# Patient Record
Sex: FEMALE | Race: WHITE | Employment: FULL TIME | URBAN - METROPOLITAN AREA
[De-identification: names, ages, dates, MRNs, and addresses within clinical notes are randomized per-mention and may not be internally consistent; named-entity substitution may affect disease eponyms.]

---

## 2017-02-21 PROBLEM — F41.9 ANXIETY: Status: ACTIVE | Noted: 2017-02-21

## 2017-03-21 PROBLEM — K21.9 GASTROESOPHAGEAL REFLUX DISEASE: Status: ACTIVE | Noted: 2017-03-21

## 2018-04-12 PROBLEM — G56.01 CARPAL TUNNEL SYNDROME OF RIGHT WRIST: Status: ACTIVE | Noted: 2018-04-12

## 2018-04-12 PROBLEM — M25.551 PAIN OF RIGHT HIP JOINT: Status: ACTIVE | Noted: 2018-04-12

## 2018-04-12 PROBLEM — M75.41 SHOULDER IMPINGEMENT SYNDROME, RIGHT: Status: ACTIVE | Noted: 2018-04-12

## 2018-04-12 PROBLEM — M54.2 CERVICALGIA: Status: ACTIVE | Noted: 2018-04-12

## 2018-04-12 PROBLEM — Z80.0 FH: ESOPHAGEAL CANCER: Status: ACTIVE | Noted: 2018-04-12

## 2019-04-11 ENCOUNTER — OFFICE VISIT (OUTPATIENT)
Dept: FAMILY MEDICINE CLINIC | Age: 64
End: 2019-04-11
Payer: COMMERCIAL

## 2019-04-11 VITALS
WEIGHT: 174 LBS | RESPIRATION RATE: 14 BRPM | HEIGHT: 66 IN | OXYGEN SATURATION: 97 % | HEART RATE: 65 BPM | SYSTOLIC BLOOD PRESSURE: 108 MMHG | BODY MASS INDEX: 27.97 KG/M2 | DIASTOLIC BLOOD PRESSURE: 74 MMHG

## 2019-04-11 DIAGNOSIS — G56.01 CARPAL TUNNEL SYNDROME OF RIGHT WRIST: ICD-10-CM

## 2019-04-11 DIAGNOSIS — F41.9 ANXIETY: ICD-10-CM

## 2019-04-11 DIAGNOSIS — E66.9 OBESITY (BMI 30-39.9): ICD-10-CM

## 2019-04-11 DIAGNOSIS — K21.9 GASTROESOPHAGEAL REFLUX DISEASE, ESOPHAGITIS PRESENCE NOT SPECIFIED: ICD-10-CM

## 2019-04-11 DIAGNOSIS — E66.01 CLASS 3 SEVERE OBESITY DUE TO EXCESS CALORIES WITH SERIOUS COMORBIDITY AND BODY MASS INDEX (BMI) OF 50.0 TO 59.9 IN ADULT (HCC): ICD-10-CM

## 2019-04-11 DIAGNOSIS — M15.9 GENERALIZED OA: ICD-10-CM

## 2019-04-11 DIAGNOSIS — Z80.0 FH: ESOPHAGEAL CANCER: ICD-10-CM

## 2019-04-11 DIAGNOSIS — M54.2 CERVICALGIA: ICD-10-CM

## 2019-04-11 DIAGNOSIS — I10 ESSENTIAL HYPERTENSION: Primary | ICD-10-CM

## 2019-04-11 PROCEDURE — 99214 OFFICE O/P EST MOD 30 MIN: CPT | Performed by: FAMILY MEDICINE

## 2019-04-11 RX ORDER — BENAZEPRIL HYDROCHLORIDE 20 MG/1
TABLET ORAL
Qty: 30 TABLET | Refills: 2 | Status: SHIPPED | OUTPATIENT
Start: 2019-04-11 | End: 2019-07-29 | Stop reason: SDUPTHER

## 2019-04-11 RX ORDER — PHENTERMINE HYDROCHLORIDE 37.5 MG/1
37.5 TABLET ORAL
Qty: 30 TABLET | Refills: 0 | Status: SHIPPED | OUTPATIENT
Start: 2019-04-11 | End: 2019-05-11

## 2019-04-11 RX ORDER — TOPIRAMATE 50 MG/1
TABLET, FILM COATED ORAL
Qty: 120 TABLET | Refills: 2 | Status: SHIPPED | OUTPATIENT
Start: 2019-04-11 | End: 2019-07-29 | Stop reason: SDUPTHER

## 2019-04-11 RX ORDER — AMLODIPINE BESYLATE 5 MG/1
TABLET ORAL
Qty: 30 TABLET | Refills: 2 | Status: SHIPPED | OUTPATIENT
Start: 2019-04-11 | End: 2019-07-29 | Stop reason: SDUPTHER

## 2019-04-11 ASSESSMENT — ENCOUNTER SYMPTOMS
VOMITING: 0
EYE DISCHARGE: 0
COLOR CHANGE: 0
ANAL BLEEDING: 0
FACIAL SWELLING: 0
WHEEZING: 0
APNEA: 0
SHORTNESS OF BREATH: 0
NAUSEA: 0
ABDOMINAL PAIN: 1
TROUBLE SWALLOWING: 0
DIARRHEA: 0
SORE THROAT: 0
PHOTOPHOBIA: 0
CHEST TIGHTNESS: 0
BACK PAIN: 0
CONSTIPATION: 0
ABDOMINAL DISTENTION: 0
EYE PAIN: 0
SINUS PRESSURE: 0

## 2019-04-11 ASSESSMENT — PATIENT HEALTH QUESTIONNAIRE - PHQ9
SUM OF ALL RESPONSES TO PHQ QUESTIONS 1-9: 0
1. LITTLE INTEREST OR PLEASURE IN DOING THINGS: 0
2. FEELING DOWN, DEPRESSED OR HOPELESS: 0
SUM OF ALL RESPONSES TO PHQ9 QUESTIONS 1 & 2: 0
SUM OF ALL RESPONSES TO PHQ QUESTIONS 1-9: 0

## 2019-04-11 NOTE — PROGRESS NOTES
Asmita Saavedra MD, PhD Janae Bravo  22.      Obdulio Munoz is a 60 y.o. female who presents today for her medical conditions/complaints as noted below. Obdulio Munoz is c/o of   Chief Complaint   Patient presents with    Hypertension     ck up    Abdominal Pain         HPI:     Hypertension   This is a chronic problem. The problem is unchanged. The problem is controlled. Pertinent negatives include no chest pain, neck pain, palpitations or shortness of breath. Abdominal Pain   This is a recurrent problem. The current episode started more than 1 month ago. The problem occurs intermittently. The pain is located in the epigastric region. Associated symptoms include arthralgias. Pertinent negatives include no constipation, diarrhea, fever, frequency, hematuria, nausea or vomiting.        No results found for: LABA1C          ( goal A1C is < 7)   No results found for: LABMICR  No results found for: LDLCHOLESTEROL, LDLCALC    (goal LDL is <100)   No results found for: AST, ALT, BUN  BP Readings from Last 3 Encounters:   04/11/19 108/74   04/12/18 (!) 160/90   03/21/17 124/80          (goal 120/80)    Past Medical History:   Diagnosis Date    Abnormal glucose     Hypertension     Hyperthyroidism     Insomnia     Obesity     Sinusitis, chronic       Past Surgical History:   Procedure Laterality Date    MOUTH SURGERY      wisdom tooth extractio    TONSILLECTOMY      UTERINE FIBROID EMBOLIZATION         Family History   Problem Relation Age of Onset    High Blood Pressure Mother     High Blood Pressure Father        Social History     Tobacco Use    Smoking status: Former Smoker    Smokeless tobacco: Never Used   Substance Use Topics    Alcohol use: Yes     Comment: every weekend      Current Outpatient Medications   Medication Sig Dispense Refill    benazepril (LOTENSIN) 20 MG tablet take 1 tablet by mouth once daily 30 tablet 2    amLODIPine (NORVASC) 5 MG tablet take 1 tablet by mouth once daily 30 tablet 2    topiramate (TOPAMAX) 50 MG tablet take 2 tablets by mouth every morning and 2 tablets by mouth every evening MUST MAKE AND APPOINTMENT 120 tablet 2    phentermine (ADIPEX-P) 37.5 MG tablet Take 1 tablet by mouth every morning (before breakfast) for 30 days. 30 tablet 0    Gabapentin Enacarbil ER (HORIZANT) 300 MG TBCR Take by mouth. .       No current facility-administered medications for this visit. No Known Allergies    Health Maintenance   Topic Date Due    Potassium monitoring  1957    Creatinine monitoring  1957    Hepatitis C screen  1955    A1C test (Diabetic or Prediabetic)  04/08/1967    HIV screen  04/08/1972    DTaP/Tdap/Td vaccine (1 - Tdap) 04/08/1976    Cervical cancer screen  04/08/1978    Lipid screen  04/08/1997    Shingles Vaccine (1 of 2) 04/08/2007    Colon cancer screen colonoscopy  04/08/2007    Flu vaccine (Season Ended) 09/01/2019    Breast cancer screen  03/21/2020    Pneumococcal 0-64 years Vaccine  Aged Out       Subjective:      Review of Systems   Constitutional: Negative for fatigue, fever and unexpected weight change. HENT: Negative for congestion, ear discharge, facial swelling, sinus pressure, sore throat and trouble swallowing. Eyes: Negative for photophobia, pain and discharge. Respiratory: Negative for apnea, chest tightness, shortness of breath and wheezing. Cardiovascular: Negative for chest pain and palpitations. Gastrointestinal: Positive for abdominal pain. Negative for abdominal distention, anal bleeding, constipation, diarrhea, nausea and vomiting. Endocrine: Negative for cold intolerance, heat intolerance, polydipsia, polyphagia and polyuria. Genitourinary: Negative for difficulty urinating, flank pain, frequency and hematuria. Musculoskeletal: Positive for arthralgias. Negative for back pain, gait problem and neck pain. Skin: Negative for color change and rash. Neurological: Negative for dizziness, syncope, facial asymmetry, speech difficulty and light-headedness. Hematological: Negative for adenopathy. Psychiatric/Behavioral: Positive for sleep disturbance. Negative for agitation, behavioral problems, confusion, hallucinations and suicidal ideas. The patient is nervous/anxious. The patient is not hyperactive. Objective:     Physical Exam   Constitutional: She is oriented to person, place, and time. She appears well-developed. No distress. HENT:   Head: Normocephalic. Neck: Normal range of motion. Neck supple. No thyromegaly present. Cardiovascular: Normal rate, regular rhythm and normal heart sounds. No murmur heard. Pulmonary/Chest: Breath sounds normal. She has no wheezes. She has no rales. She exhibits no tenderness. Abdominal: Soft. Bowel sounds are normal. She exhibits no distension and no mass. There is no tenderness. There is no rebound and no guarding. Musculoskeletal: Normal range of motion. She exhibits no edema. Lymphadenopathy:     She has no cervical adenopathy. Neurological: She is alert and oriented to person, place, and time. Skin: Skin is warm. No rash noted. Psychiatric: Her speech is normal and behavior is normal. Judgment and thought content normal. Her mood appears anxious. Cognition and memory are normal.   Nursing note and vitals reviewed. /74   Pulse 65   Resp 14   Ht 5' 5.98\" (1.676 m)   Wt 174 lb (78.9 kg)   SpO2 97%   BMI 28.10 kg/m²     Assessment:       Diagnosis Orders   1. Essential hypertension  benazepril (LOTENSIN) 20 MG tablet    amLODIPine (NORVASC) 5 MG tablet   2. Cervicalgia  topiramate (TOPAMAX) 50 MG tablet   3. FH: esophageal cancer  External Referral To Gastroenterology   4. Gastroesophageal reflux disease, esophagitis presence not specified  External Referral To Gastroenterology   5.  Class 3 severe obesity due to excess calories with since your last visit? No  Have you been seen in the emergency room and/or had an admission to a hospital since we last saw you? No  Have you had your routine dental cleaning in the past 6 months? yes     Have you activated your [employer] account? If not, what are your barriers?  No:    Patient Care Team:  Liset Shepherd MD as PCP - General (Family Medicine)    Medical History Review  Past Medical, Family, and Social History reviewed and does not contribute to the patient presenting condition    Health Maintenance   Topic Date Due    Potassium monitoring  1957    Creatinine monitoring  1957    Hepatitis C screen  1955    A1C test (Diabetic or Prediabetic)  04/08/1967    HIV screen  04/08/1972    DTaP/Tdap/Td vaccine (1 - Tdap) 04/08/1976    Cervical cancer screen  04/08/1978    Lipid screen  04/08/1997    Shingles Vaccine (1 of 2) 04/08/2007    Colon cancer screen colonoscopy  04/08/2007    Flu vaccine (Season Ended) 09/01/2019    Breast cancer screen  03/21/2020    Pneumococcal 0-64 years Vaccine  Aged Out

## 2019-05-09 ENCOUNTER — OFFICE VISIT (OUTPATIENT)
Dept: FAMILY MEDICINE CLINIC | Age: 64
End: 2019-05-09
Payer: COMMERCIAL

## 2019-05-09 VITALS
SYSTOLIC BLOOD PRESSURE: 124 MMHG | HEART RATE: 81 BPM | WEIGHT: 170 LBS | DIASTOLIC BLOOD PRESSURE: 82 MMHG | BODY MASS INDEX: 27.32 KG/M2 | HEIGHT: 66 IN

## 2019-05-09 DIAGNOSIS — M75.41 SHOULDER IMPINGEMENT SYNDROME, RIGHT: ICD-10-CM

## 2019-05-09 DIAGNOSIS — I10 ESSENTIAL HYPERTENSION: Primary | ICD-10-CM

## 2019-05-09 DIAGNOSIS — G56.01 CARPAL TUNNEL SYNDROME OF RIGHT WRIST: ICD-10-CM

## 2019-05-09 DIAGNOSIS — E66.9 OBESITY (BMI 30-39.9): ICD-10-CM

## 2019-05-09 DIAGNOSIS — F51.01 PRIMARY INSOMNIA: ICD-10-CM

## 2019-05-09 DIAGNOSIS — M54.2 CERVICALGIA: ICD-10-CM

## 2019-05-09 DIAGNOSIS — F41.9 ANXIETY: ICD-10-CM

## 2019-05-09 DIAGNOSIS — M15.9 GENERALIZED OA: ICD-10-CM

## 2019-05-09 DIAGNOSIS — K21.9 GASTROESOPHAGEAL REFLUX DISEASE, ESOPHAGITIS PRESENCE NOT SPECIFIED: ICD-10-CM

## 2019-05-09 PROCEDURE — 99213 OFFICE O/P EST LOW 20 MIN: CPT | Performed by: FAMILY MEDICINE

## 2019-05-09 RX ORDER — PHENTERMINE HYDROCHLORIDE 37.5 MG/1
37.5 TABLET ORAL
Qty: 30 TABLET | Refills: 0 | Status: SHIPPED | OUTPATIENT
Start: 2019-05-09 | End: 2019-06-08

## 2019-05-09 ASSESSMENT — ENCOUNTER SYMPTOMS
ANAL BLEEDING: 0
BACK PAIN: 0
TROUBLE SWALLOWING: 0
SORE THROAT: 0
DIARRHEA: 0
CHEST TIGHTNESS: 0
FACIAL SWELLING: 0
APNEA: 0
EYE DISCHARGE: 0
VOMITING: 0
CONSTIPATION: 0
PHOTOPHOBIA: 0
WHEEZING: 0
ABDOMINAL PAIN: 0
SHORTNESS OF BREATH: 0
EYE PAIN: 0
NAUSEA: 0
ABDOMINAL DISTENTION: 0
SINUS PRESSURE: 0
COLOR CHANGE: 0

## 2019-05-09 NOTE — PROGRESS NOTES
Marylee Boros, MD, PhD Delma Roxy Bravo  22.      Sherrie Patel is a 60 y.o. female who presents today for her medical conditions/complaints as noted below. Sherrie Patel is c/o of   Chief Complaint   Patient presents with    Other     weight ck    Hypertension         HPI:     Other   This is a chronic (obesity) problem. The problem has been gradually improving. Associated symptoms include arthralgias. Pertinent negatives include no abdominal pain, chest pain, congestion, fatigue, fever, nausea, neck pain, rash, sore throat or vomiting. Hypertension   This is a chronic problem. The current episode started more than 1 year ago. The problem is controlled. Pertinent negatives include no chest pain, neck pain, palpitations or shortness of breath. No results found for: LABA1C          ( goal A1C is < 7)   No results found for: LABMICR  No results found for: LDLCHOLESTEROL, LDLCALC    (goal LDL is <100)   No results found for: AST, ALT, BUN  BP Readings from Last 3 Encounters:   05/09/19 124/82   04/11/19 108/74   04/12/18 (!) 160/90          (goal 120/80)    Past Medical History:   Diagnosis Date    Abnormal glucose     Hypertension     Hyperthyroidism     Insomnia     Obesity     Sinusitis, chronic       Past Surgical History:   Procedure Laterality Date    MOUTH SURGERY      wisdom tooth extractio    TONSILLECTOMY      UTERINE FIBROID EMBOLIZATION         Family History   Problem Relation Age of Onset    High Blood Pressure Mother     High Blood Pressure Father        Social History     Tobacco Use    Smoking status: Former Smoker    Smokeless tobacco: Never Used   Substance Use Topics    Alcohol use: Yes     Comment: every weekend      Current Outpatient Medications   Medication Sig Dispense Refill    phentermine (ADIPEX-P) 37.5 MG tablet Take 1 tablet by mouth every morning (before breakfast) for 30 days. 30 tablet 0    benazepril (LOTENSIN) 20 MG tablet take 1 tablet by mouth once daily 30 tablet 2    amLODIPine (NORVASC) 5 MG tablet take 1 tablet by mouth once daily 30 tablet 2    topiramate (TOPAMAX) 50 MG tablet take 2 tablets by mouth every morning and 2 tablets by mouth every evening MUST MAKE AND APPOINTMENT 120 tablet 2    phentermine (ADIPEX-P) 37.5 MG tablet Take 1 tablet by mouth every morning (before breakfast) for 30 days. 30 tablet 0    Gabapentin Enacarbil ER (HORIZANT) 300 MG TBCR Take by mouth. .       No current facility-administered medications for this visit. No Known Allergies    Health Maintenance   Topic Date Due    Potassium monitoring  1957    Creatinine monitoring  1957    Hepatitis C screen  1955    A1C test (Diabetic or Prediabetic)  04/08/1967    HIV screen  04/08/1972    DTaP/Tdap/Td vaccine (1 - Tdap) 04/08/1976    Cervical cancer screen  04/08/1978    Lipid screen  04/08/1997    Shingles Vaccine (1 of 2) 04/08/2007    Colon cancer screen colonoscopy  04/08/2007    Flu vaccine (Season Ended) 09/01/2019    Breast cancer screen  03/21/2020    Pneumococcal 0-64 years Vaccine  Aged Out       Subjective:      Review of Systems   Constitutional: Negative for fatigue, fever and unexpected weight change. HENT: Negative for congestion, ear discharge, facial swelling, sinus pressure, sore throat and trouble swallowing. Eyes: Negative for photophobia, pain and discharge. Respiratory: Negative for apnea, chest tightness, shortness of breath and wheezing. Cardiovascular: Negative for chest pain and palpitations. Gastrointestinal: Negative for abdominal distention, abdominal pain, anal bleeding, constipation, diarrhea, nausea and vomiting. Endocrine: Negative for cold intolerance, heat intolerance, polydipsia, polyphagia and polyuria. Genitourinary: Negative for difficulty urinating, flank pain, frequency and hematuria.    Musculoskeletal: Positive for arthralgias. Negative for back pain, gait problem and neck pain. Skin: Negative for color change and rash. Neurological: Negative for dizziness, syncope, facial asymmetry, speech difficulty and light-headedness. Hematological: Negative for adenopathy. Psychiatric/Behavioral: Negative for agitation, behavioral problems, confusion, hallucinations and suicidal ideas. The patient is nervous/anxious. The patient is not hyperactive. Objective:     Physical Exam   Constitutional: She is oriented to person, place, and time. She appears well-developed. No distress. HENT:   Head: Normocephalic. Neck: Normal range of motion. Neck supple. No thyromegaly present. Cardiovascular: Normal rate, regular rhythm and normal heart sounds. No murmur heard. Pulmonary/Chest: Breath sounds normal. She has no wheezes. She has no rales. She exhibits no tenderness. Abdominal: Soft. Bowel sounds are normal. She exhibits no distension and no mass. There is no tenderness. There is no rebound and no guarding. Musculoskeletal: Normal range of motion. She exhibits no edema. Lymphadenopathy:     She has no cervical adenopathy. Neurological: She is alert and oriented to person, place, and time. Skin: Skin is warm. No rash noted. Psychiatric: She has a normal mood and affect. Her speech is normal and behavior is normal. Judgment and thought content normal. Cognition and memory are normal.   Nursing note and vitals reviewed. /82   Pulse 81   Ht 5' 5.98\" (1.676 m)   Wt 170 lb (77.1 kg)   BMI 27.45 kg/m²     Assessment:       Diagnosis Orders   1. Essential hypertension     2. Obesity (BMI 30-39.9)  phentermine (ADIPEX-P) 37.5 MG tablet   3. Cervicalgia     4. Gastroesophageal reflux disease, esophagitis presence not specified     5. Generalized OA     6. Primary insomnia     7. Shoulder impingement syndrome, right     8. Anxiety     9.  Carpal tunnel syndrome of right wrist Plan:    Weight loss 4 lbs  The current medical regimen is effective;  continue present plan and medications. Controlled Substances Monitoring:     RX Monitoring 5/9/2019   Attestation The Prescription Monitoring Report for this patient was reviewed today. Chronic Pain Routine Monitoring No signs of potential drug abuse or diversion identified: otherwise, see note documentation     Continue weight reduction program with Adipex/ exercise and diet  Return in about 1 month (around 6/9/2019) for follow up. No orders of the defined types were placed in this encounter. Patient given educational materials - see patient instructions. Discussed use, benefit,and side effects of prescribed medications. All patient questions answered. Pt voiced understanding. Reviewed health maintenance. Instructed to continue current medications, diet and exercise. Patient agreed withtreatment plan. Follow up as directed. Electronically signed by Gabby Valencia MD on 5/9/2019 at 5:05 PMVisit Information    Have you changed or started any medications since your last visit including any over-the-counter medicines, vitamins, or herbal medicines? no   Are you having any side effects from any of your medications? -  no  Have you stopped taking any of your medications? Is so, why? -  no    Have you seen any other physician or provider since your last visit? No  Have you had any other diagnostic tests since your last visit? No  Have you been seen in the emergency room and/or had an admission to a hospital since we last saw you? No  Have you had your routine dental cleaning in the past 6 months? yes     Have you activated your [employer] account? If not, what are your barriers?  No:   Patient Care Team:  Gabby Valencia MD as PCP - General (Family Medicine)    Medical History Review  Past Medical, Family, and Social History reviewed and does not contribute to the patient presenting condition    Health Maintenance   Topic Date Due    Potassium monitoring  1957    Creatinine monitoring  1957    Hepatitis C screen  1957    A1C test (Diabetic or Prediabetic)  04/08/1967    HIV screen  04/08/1972    DTaP/Tdap/Td vaccine (1 - Tdap) 04/08/1976    Cervical cancer screen  04/08/1978    Lipid screen  04/08/1997    Shingles Vaccine (1 of 2) 04/08/2007    Colon cancer screen colonoscopy  04/08/2007    Flu vaccine (Season Ended) 09/01/2019    Breast cancer screen  03/21/2020    Pneumococcal 0-64 years Vaccine  Aged [place]

## 2019-06-10 ENCOUNTER — OFFICE VISIT (OUTPATIENT)
Dept: FAMILY MEDICINE CLINIC | Age: 64
End: 2019-06-10
Payer: COMMERCIAL

## 2019-06-10 VITALS
HEIGHT: 66 IN | SYSTOLIC BLOOD PRESSURE: 136 MMHG | DIASTOLIC BLOOD PRESSURE: 86 MMHG | WEIGHT: 165 LBS | BODY MASS INDEX: 26.52 KG/M2

## 2019-06-10 DIAGNOSIS — M75.41 SHOULDER IMPINGEMENT SYNDROME, RIGHT: ICD-10-CM

## 2019-06-10 DIAGNOSIS — S93.401D SPRAIN OF RIGHT ANKLE, UNSPECIFIED LIGAMENT, SUBSEQUENT ENCOUNTER: ICD-10-CM

## 2019-06-10 DIAGNOSIS — G56.01 CARPAL TUNNEL SYNDROME OF RIGHT WRIST: ICD-10-CM

## 2019-06-10 DIAGNOSIS — F51.01 PRIMARY INSOMNIA: ICD-10-CM

## 2019-06-10 DIAGNOSIS — E66.9 OBESITY (BMI 30-39.9): ICD-10-CM

## 2019-06-10 DIAGNOSIS — S90.01XA CONTUSION OF RIGHT ANKLE, INITIAL ENCOUNTER: ICD-10-CM

## 2019-06-10 DIAGNOSIS — M15.9 GENERALIZED OA: ICD-10-CM

## 2019-06-10 DIAGNOSIS — I10 ESSENTIAL HYPERTENSION: Primary | ICD-10-CM

## 2019-06-10 DIAGNOSIS — K21.9 GASTROESOPHAGEAL REFLUX DISEASE, ESOPHAGITIS PRESENCE NOT SPECIFIED: ICD-10-CM

## 2019-06-10 DIAGNOSIS — E66.3 OVERWEIGHT (BMI 25.0-29.9): ICD-10-CM

## 2019-06-10 DIAGNOSIS — M54.2 CERVICALGIA: ICD-10-CM

## 2019-06-10 DIAGNOSIS — S30.0XXD LUMBAR CONTUSION, SUBSEQUENT ENCOUNTER: ICD-10-CM

## 2019-06-10 PROCEDURE — 99214 OFFICE O/P EST MOD 30 MIN: CPT | Performed by: FAMILY MEDICINE

## 2019-06-10 RX ORDER — PHENTERMINE HYDROCHLORIDE 37.5 MG/1
37.5 TABLET ORAL
Qty: 30 TABLET | Refills: 0 | Status: SHIPPED | OUTPATIENT
Start: 2019-06-10 | End: 2019-07-10

## 2019-06-10 RX ORDER — MELOXICAM 15 MG/1
15 TABLET ORAL DAILY
Qty: 14 TABLET | Refills: 0 | Status: SHIPPED | OUTPATIENT
Start: 2019-06-10 | End: 2020-03-12 | Stop reason: SDUPTHER

## 2019-06-10 RX ORDER — PHENTERMINE HYDROCHLORIDE 37.5 MG/1
37.5 TABLET ORAL
COMMUNITY
Start: 2019-05-09

## 2019-06-10 RX ORDER — TIZANIDINE HYDROCHLORIDE 2 MG/1
CAPSULE, GELATIN COATED ORAL
Qty: 14 CAPSULE | Refills: 0 | Status: SHIPPED | OUTPATIENT
Start: 2019-06-10

## 2019-06-10 ASSESSMENT — ENCOUNTER SYMPTOMS
PHOTOPHOBIA: 0
FACIAL SWELLING: 0
TROUBLE SWALLOWING: 0
WHEEZING: 0
DIARRHEA: 0
NAUSEA: 0
ABDOMINAL PAIN: 0
EYE DISCHARGE: 0
EYE PAIN: 0
APNEA: 0
ABDOMINAL DISTENTION: 0
SINUS PRESSURE: 0
COLOR CHANGE: 0
SORE THROAT: 0
VOMITING: 0
ANAL BLEEDING: 0
SHORTNESS OF BREATH: 0
CONSTIPATION: 0
CHEST TIGHTNESS: 0
BACK PAIN: 1

## 2019-06-10 NOTE — PROGRESS NOTES
Lisa Jones MD, PhD Reji Bravo  22.      Susana Parikh is a 60 y.o. female who presents today for her medical conditions/complaints as noted below. Susana Parikh is c/o of   Chief Complaint   Patient presents with    Other     weight ck and refill    Ankle Pain    Joint Pain    Anxiety         HPI:     Ankle Pain    The incident occurred more than 1 week ago. The injury mechanism was a twisting injury. The pain is present in the right ankle. The pain is at a severity of 4/10. The pain is moderate. The pain has been improving since onset. Fatigue   This is a recurrent problem. The current episode started more than 1 year ago. The problem has been waxing and waning. Associated symptoms include arthralgias, fatigue and joint swelling. Pertinent negatives include no abdominal pain, chest pain, congestion, fever, nausea, neck pain, rash, sore throat or vomiting.        No results found for: LABA1C          ( goal A1C is < 7)   No results found for: LABMICR  No results found for: LDLCHOLESTEROL, LDLCALC    (goal LDL is <100)   No results found for: AST, ALT, BUN  BP Readings from Last 3 Encounters:   06/10/19 136/86   05/09/19 124/82   04/11/19 108/74          (goal 120/80)    Past Medical History:   Diagnosis Date    Abnormal glucose     Hypertension     Hyperthyroidism     Insomnia     Obesity     Sinusitis, chronic       Past Surgical History:   Procedure Laterality Date    MOUTH SURGERY      wisdom tooth extractio    TONSILLECTOMY      UTERINE FIBROID EMBOLIZATION         Family History   Problem Relation Age of Onset    High Blood Pressure Mother     High Blood Pressure Father        Social History     Tobacco Use    Smoking status: Former Smoker    Smokeless tobacco: Never Used   Substance Use Topics    Alcohol use: Yes     Comment: every weekend      Current Outpatient Medications   Medication Sig Dispense Refill    phentermine (ADIPEX-P) 37.5 MG tablet Take 37.5 mg by mouth.  tiZANidine (ZANAFLEX) 2 MG capsule Take 2mg nightly for 2 weeks 14 capsule 0    meloxicam (MOBIC) 15 MG tablet Take 1 tablet by mouth daily for 14 days 14 tablet 0    phentermine (ADIPEX-P) 37.5 MG tablet Take 1 tablet by mouth every morning (before breakfast) for 30 days. 30 tablet 0    benazepril (LOTENSIN) 20 MG tablet take 1 tablet by mouth once daily 30 tablet 2    amLODIPine (NORVASC) 5 MG tablet take 1 tablet by mouth once daily 30 tablet 2    topiramate (TOPAMAX) 50 MG tablet take 2 tablets by mouth every morning and 2 tablets by mouth every evening MUST MAKE AND APPOINTMENT 120 tablet 2    Gabapentin Enacarbil ER (HORIZANT) 300 MG TBCR Take by mouth. .       No current facility-administered medications for this visit. No Known Allergies    Health Maintenance   Topic Date Due    Potassium monitoring  1957    Creatinine monitoring  1957    Hepatitis C screen  1957    A1C test (Diabetic or Prediabetic)  04/08/1967    HIV screen  04/08/1972    DTaP/Tdap/Td vaccine (1 - Tdap) 04/08/1976    Cervical cancer screen  04/08/1978    Lipid screen  04/08/1997    Shingles Vaccine (1 of 2) 04/08/2007    Colon cancer screen colonoscopy  04/08/2007    Flu vaccine (Season Ended) 09/01/2019    Breast cancer screen  03/21/2020    Pneumococcal 0-64 years Vaccine  Aged Out       Subjective:      Review of Systems   Constitutional: Positive for fatigue. Negative for fever and unexpected weight change. HENT: Negative for congestion, ear discharge, facial swelling, sinus pressure, sore throat and trouble swallowing. Eyes: Negative for photophobia, pain and discharge. Respiratory: Negative for apnea, chest tightness, shortness of breath and wheezing. Cardiovascular: Negative for chest pain and palpitations.    Gastrointestinal: Negative for abdominal distention, abdominal pain, anal bleeding, Wt 165 lb (74.8 kg)   BMI 26.64 kg/m²     Assessment:       Diagnosis Orders   1. Essential hypertension     2. Contusion of right ankle, initial encounter  tiZANidine (ZANAFLEX) 2 MG capsule    meloxicam (MOBIC) 15 MG tablet   3. Overweight (BMI 25.0-29.9)  phentermine (ADIPEX-P) 37.5 MG tablet   4. Cervicalgia     5. Gastroesophageal reflux disease, esophagitis presence not specified     6. Generalized OA     7. Primary insomnia     8. Obesity (BMI 30-39.9)     9. Shoulder impingement syndrome, right     10. Carpal tunnel syndrome of right wrist     11. Sprain of right ankle, unspecified ligament, subsequent encounter     12. Lumbar contusion, subsequent encounter                   Plan:    Sweedo brace/high shoes  Fall precautions  Mobic 15 mg prn  Zanaflex 4 mg at bed time prn  Weight reduction - Adipex P/diet/excercise  Otherwise the current medical regimen is effective;  continue present plan and medications. Controlled Substance Monitoring:    Acute and Chronic Pain Monitoring:   RX Monitoring 6/10/2019   Attestation -   Periodic Controlled Substance Monitoring No signs of potential drug abuse or diversion identified. Return in about 1 month (around 7/10/2019) for follow up. No orders of the defined types were placed in this encounter. Patient given educational materials - see patient instructions. Discussed use, benefit,and side effects of prescribed medications. All patient questions answered. Pt voiced understanding. Reviewed health maintenance. Instructed to continue current medications, diet and exercise. Patient agreed withtreatment plan. Follow up as directed. Electronically signed by Mac Osborn MD on 6/10/2019 at 6:35 PMVisit Information    Have you changed or started any medications since your last visit including any over-the-counter medicines, vitamins, or herbal medicines?  no   Are you having any side effects from any of your medications? -  no  Have you stopped taking any of your medications? Is so, why? -  no    Have you seen any other physician or provider since your last visit? No  Have you had any other diagnostic tests since your last visit? No  Have you been seen in the emergency room and/or had an admission to a hospital since we last saw you? No  Have you had your routine dental cleaning in the past 6 months? yes     Have you activated your [employer] account? If not, what are your barriers?  No:    Patient Care Team:  Giana Andrade MD as PCP - General (Family Medicine)  Giana Andrade MD as PCP - [facility] Provider    Medical History Review  Past Medical, Family, and Social History reviewed and does not contribute to the patient presenting condition    Health Maintenance   Topic Date Due    Potassium monitoring  1957    Creatinine monitoring  1957    Hepatitis C screen  1957    A1C test (Diabetic or Prediabetic)  04/08/1967    HIV screen  04/08/1972    DTaP/Tdap/Td vaccine (1 - Tdap) 04/08/1976    Cervical cancer screen  04/08/1978    Lipid screen  04/08/1997    Shingles Vaccine (1 of 2) 04/08/2007    Colon cancer screen colonoscopy  04/08/2007    Flu vaccine (Season Ended) 09/01/2019    Breast cancer screen  03/21/2020    Pneumococcal 0-64 years Vaccine  Aged Out

## 2019-07-29 DIAGNOSIS — I10 ESSENTIAL HYPERTENSION: ICD-10-CM

## 2019-07-29 DIAGNOSIS — M54.2 CERVICALGIA: ICD-10-CM

## 2019-07-29 DIAGNOSIS — E66.9 OBESITY (BMI 30-39.9): Primary | ICD-10-CM

## 2019-07-29 RX ORDER — TOPIRAMATE 50 MG/1
TABLET, FILM COATED ORAL
Qty: 120 TABLET | Refills: 3 | Status: SHIPPED | OUTPATIENT
Start: 2019-07-29 | End: 2019-11-29 | Stop reason: SDUPTHER

## 2019-07-29 RX ORDER — AMLODIPINE BESYLATE 5 MG/1
TABLET ORAL
Qty: 30 TABLET | Refills: 3 | Status: SHIPPED | OUTPATIENT
Start: 2019-07-29 | End: 2019-11-29 | Stop reason: SDUPTHER

## 2019-07-29 RX ORDER — BENAZEPRIL HYDROCHLORIDE 20 MG/1
TABLET ORAL
Qty: 30 TABLET | Refills: 2 | Status: SHIPPED | OUTPATIENT
Start: 2019-07-29 | End: 2019-11-04 | Stop reason: SDUPTHER

## 2019-08-16 ENCOUNTER — TELEPHONE (OUTPATIENT)
Dept: FAMILY MEDICINE CLINIC | Age: 64
End: 2019-08-16

## 2019-08-29 ENCOUNTER — OFFICE VISIT (OUTPATIENT)
Dept: FAMILY MEDICINE CLINIC | Age: 64
End: 2019-08-29
Payer: COMMERCIAL

## 2019-08-29 VITALS
TEMPERATURE: 99.1 F | DIASTOLIC BLOOD PRESSURE: 76 MMHG | HEART RATE: 75 BPM | WEIGHT: 161 LBS | BODY MASS INDEX: 26 KG/M2 | SYSTOLIC BLOOD PRESSURE: 123 MMHG | RESPIRATION RATE: 14 BRPM | OXYGEN SATURATION: 98 %

## 2019-08-29 DIAGNOSIS — K21.9 GASTROESOPHAGEAL REFLUX DISEASE, ESOPHAGITIS PRESENCE NOT SPECIFIED: ICD-10-CM

## 2019-08-29 DIAGNOSIS — M15.9 GENERALIZED OA: ICD-10-CM

## 2019-08-29 DIAGNOSIS — F51.01 PRIMARY INSOMNIA: ICD-10-CM

## 2019-08-29 DIAGNOSIS — E66.9 OBESITY (BMI 30-39.9): ICD-10-CM

## 2019-08-29 DIAGNOSIS — I10 ESSENTIAL HYPERTENSION: Primary | ICD-10-CM

## 2019-08-29 DIAGNOSIS — R73.01 IFG (IMPAIRED FASTING GLUCOSE): ICD-10-CM

## 2019-08-29 DIAGNOSIS — J45.909 ACUTE ASTHMATIC BRONCHITIS: ICD-10-CM

## 2019-08-29 PROCEDURE — 99214 OFFICE O/P EST MOD 30 MIN: CPT | Performed by: FAMILY MEDICINE

## 2019-08-29 RX ORDER — LEVOFLOXACIN 750 MG/1
750 TABLET ORAL DAILY
Qty: 7 TABLET | Refills: 0 | Status: SHIPPED | OUTPATIENT
Start: 2019-08-29 | End: 2019-09-05

## 2019-08-29 RX ORDER — PREDNISONE 10 MG/1
TABLET ORAL
Qty: 16 TABLET | Refills: 0 | Status: SHIPPED | OUTPATIENT
Start: 2019-08-29

## 2019-08-29 ASSESSMENT — ENCOUNTER SYMPTOMS
SORE THROAT: 0
EYE DISCHARGE: 0
CHEST TIGHTNESS: 0
VOMITING: 0
APNEA: 0
ABDOMINAL PAIN: 0
CONSTIPATION: 0
EYE PAIN: 0
NAUSEA: 0
ABDOMINAL DISTENTION: 0
DIARRHEA: 0
SINUS PRESSURE: 0
COLOR CHANGE: 0
PHOTOPHOBIA: 0
COUGH: 1
WHEEZING: 1
BACK PAIN: 0
ANAL BLEEDING: 0
FACIAL SWELLING: 0
TROUBLE SWALLOWING: 0
SHORTNESS OF BREATH: 1

## 2019-08-29 NOTE — LETTER
[facility]  [address] [place] [address] [address] [facility] [address]  Phone: [phone]  Fax: [phone]    Kelly Haynes MD        August 29, 2019     Patient: Quirino Feng   YOB: 1955   Date of Visit: 8/29/2019       To Whom it May Concern:    Quirino Feng was seen in my clinic on 8/29/2019. She may return to work on on 09/03/2019, without restrictions. If you have any questions or concerns, please don't hesitate to call.     Sincerely,         Kelly Haynes MD

## 2019-08-29 NOTE — PROGRESS NOTES
Janet Plunkett MD, PhD [address]      Carmine Pradhan is a 60 y.o. female who presents today for her medical conditions/complaints as noted below. Carmine Pradhan is c/o of   Chief Complaint   Patient presents with    Chest Congestion    Cough    Fever    Hypertension    Joint Pain         HPI:     Cough   This is a new problem. The current episode started in the past 7 days. The problem has been gradually worsening. The cough is productive of sputum. Associated symptoms include a fever, shortness of breath and wheezing. Pertinent negatives include no chest pain, rash or sore throat. Fever    This is a new problem. The current episode started yesterday. The maximum temperature noted was 100 to 100.9 F. Associated symptoms include coughing and wheezing. Pertinent negatives include no abdominal pain, chest pain, congestion, diarrhea, nausea, rash, sore throat or vomiting. Hypertension   This is a chronic problem. The current episode started more than 1 year ago. The problem is unchanged. The problem is controlled. Associated symptoms include shortness of breath. Pertinent negatives include no chest pain, neck pain or palpitations.        No results found for: LABA1C          ( goal A1C is < 7)   No results found for: LABMICR  No results found for: LDLCHOLESTEROL, LDLCALC    (goal LDL is <100)   No results found for: AST, ALT, BUN  BP Readings from Last 3 Encounters:   08/29/19 123/76   06/10/19 136/86   05/09/19 124/82          (goal 120/80)    Past Medical History:   Diagnosis Date    Abnormal glucose     Hypertension     Hyperthyroidism     Insomnia     Obesity     Sinusitis, chronic       Past Surgical History:   Procedure Laterality Date    MOUTH SURGERY      wisdom tooth extractio    TONSILLECTOMY      UTERINE FIBROID EMBOLIZATION         Family History   Problem Relation Age of Onset    High Blood adenopathy. Neurological: She is alert and oriented to person, place, and time. Skin: Skin is warm. No rash noted. Psychiatric: Her speech is normal and behavior is normal. Judgment and thought content normal. Her mood appears anxious. Cognition and memory are normal.   Nursing note and vitals reviewed. /76   Pulse 75   Temp 99.1 °F (37.3 °C) (Oral)   Resp 14   Wt 161 lb (73 kg)   SpO2 98%   BMI 26.00 kg/m²     Assessment:       Diagnosis Orders   1. Essential hypertension  CBC Auto Differential    Comprehensive Metabolic Panel    Lipid Panel    TSH without Reflex    Hemoglobin A1C    Microalbumin, Ur    Vitamin D 25 Hydroxy   2. IFG (impaired fasting glucose)     3. Obesity (BMI 30-39.9)     4. Gastroesophageal reflux disease, esophagitis presence not specified     5. Primary insomnia     6. Generalized OA     7. Acute asthmatic bronchitis                   Plan:    Levaquin x 7 days  Prednisone tapered  Stieolto daily/samples  The current medical regimen is effective;  continue present plan and medications. Full labs  Life styyle modifications in details      Return in about 1 month (around 9/29/2019) for follow up.     Orders Placed This Encounter   Procedures    CBC Auto Differential     Standing Status:   Future     Standing Expiration Date:   8/28/2020    Comprehensive Metabolic Panel     Standing Status:   Future     Standing Expiration Date:   8/28/2020    Lipid Panel     Standing Status:   Future     Standing Expiration Date:   8/28/2020     Order Specific Question:   Is Patient Fasting?/# of Hours     Answer:   yes    TSH without Reflex     Standing Status:   Future     Standing Expiration Date:   8/28/2020    Hemoglobin A1C     Standing Status:   Future     Standing Expiration Date:   8/28/2020    Microalbumin, Ur     Standing Status:   Future     Standing Expiration Date:   8/29/2020    Vitamin D 25 Hydroxy     Standing Status:   Future     Standing Expiration Date: 8/29/2020        Patient given educational materials - see patient instructions. Discussed use, benefit,and side effects of prescribed medications. All patient questions answered. Pt voiced understanding. Reviewed health maintenance. Instructed to continue current medications, diet and exercise. Patient agreed withtreatment plan. Follow up as directed. Electronically signed by Jada Mancilla MD on 8/29/2019 at [address] [place] [address]    Have you changed or started any medications since your last visit including any over-the-counter medicines, vitamins, or herbal medicines? no   Are you having any side effects from any of your medications? -  no  Have you stopped taking any of your medications? Is so, why? -  no    Have you seen any other physician or provider since your last visit? No  Have you had any other diagnostic tests since your last visit? No  Have you been seen in the emergency room and/or had an admission to a hospital since we last saw you? No  Have you had your routine dental cleaning in the past 6 months? no    Have you activated your [employer] account? If not, what are your barriers?  No:     Patient Care Team:  Jada Mancilla MD as PCP - General (Family Medicine)  Jada Mancilla MD as PCP - [facility]    Medical History Review  Past Medical, Family, and Social History reviewed and does not contribute to the patient presenting condition    Health Maintenance   Topic Date Due    Potassium monitoring  1955    Creatinine monitoring  1955    Hepatitis C screen  1955    A1C test (Diabetic or Prediabetic)  04/08/1967    HIV screen  04/08/1972    DTaP/Tdap/Td vaccine (1 - Tdap) 04/08/1976    Cervical cancer screen  04/08/1978    Lipid screen  04/08/1997    Shingles Vaccine (1 of 2) 04/08/2007    Colon cancer screen colonoscopy  04/08/2007    Flu vaccine (1) 09/01/2019    Breast cancer screen  03/21/2020    Pneumococcal 0-64 years

## 2019-09-05 ENCOUNTER — TELEPHONE (OUTPATIENT)
Dept: FAMILY MEDICINE CLINIC | Age: 64
End: 2019-09-05

## 2019-09-05 NOTE — TELEPHONE ENCOUNTER
Pt states she was seen 8/29/19 for bronchitis and finished abx, went to work this week but feel tired and wore down today and would like a off work note for today.   Wants it faxed to:  [phone] (c)

## 2019-09-19 ENCOUNTER — TELEPHONE (OUTPATIENT)
Dept: FAMILY MEDICINE CLINIC | Age: 64
End: 2019-09-19

## 2019-09-20 ENCOUNTER — OFFICE VISIT (OUTPATIENT)
Dept: FAMILY MEDICINE CLINIC | Age: 64
End: 2019-09-20
Payer: COMMERCIAL

## 2019-09-20 VITALS
BODY MASS INDEX: 25.71 KG/M2 | HEART RATE: 78 BPM | WEIGHT: 160 LBS | SYSTOLIC BLOOD PRESSURE: 113 MMHG | OXYGEN SATURATION: 98 % | HEIGHT: 66 IN | DIASTOLIC BLOOD PRESSURE: 76 MMHG

## 2019-09-20 DIAGNOSIS — E66.9 OBESITY (BMI 30-39.9): ICD-10-CM

## 2019-09-20 DIAGNOSIS — K21.9 GASTROESOPHAGEAL REFLUX DISEASE, ESOPHAGITIS PRESENCE NOT SPECIFIED: ICD-10-CM

## 2019-09-20 DIAGNOSIS — A49.3 MYCOPLASMA INFECTION: ICD-10-CM

## 2019-09-20 DIAGNOSIS — M75.41 SHOULDER IMPINGEMENT SYNDROME, RIGHT: ICD-10-CM

## 2019-09-20 DIAGNOSIS — R73.01 IFG (IMPAIRED FASTING GLUCOSE): ICD-10-CM

## 2019-09-20 DIAGNOSIS — I10 ESSENTIAL HYPERTENSION: Primary | ICD-10-CM

## 2019-09-20 DIAGNOSIS — J45.909 ACUTE ASTHMATIC BRONCHITIS: ICD-10-CM

## 2019-09-20 DIAGNOSIS — M15.9 GENERALIZED OA: ICD-10-CM

## 2019-09-20 DIAGNOSIS — M54.2 CERVICALGIA: ICD-10-CM

## 2019-09-20 DIAGNOSIS — J32.0 CHRONIC MAXILLARY SINUSITIS: ICD-10-CM

## 2019-09-20 DIAGNOSIS — F51.01 PRIMARY INSOMNIA: ICD-10-CM

## 2019-09-20 PROCEDURE — 99213 OFFICE O/P EST LOW 20 MIN: CPT | Performed by: FAMILY MEDICINE

## 2019-09-20 RX ORDER — AZITHROMYCIN 500 MG/1
500 TABLET, FILM COATED ORAL DAILY
Qty: 5 TABLET | Refills: 0 | Status: SHIPPED | OUTPATIENT
Start: 2019-09-20 | End: 2019-09-25

## 2019-09-20 RX ORDER — PREDNISONE 10 MG/1
TABLET ORAL
Qty: 15 TABLET | Refills: 0 | Status: SHIPPED | OUTPATIENT
Start: 2019-09-20

## 2019-09-20 ASSESSMENT — ENCOUNTER SYMPTOMS
ANAL BLEEDING: 0
TROUBLE SWALLOWING: 0
CHEST TIGHTNESS: 0
EYE PAIN: 0
VOMITING: 0
COLOR CHANGE: 0
NAUSEA: 0
APNEA: 0
CONSTIPATION: 0
EYE DISCHARGE: 0
SHORTNESS OF BREATH: 0
COUGH: 1
ABDOMINAL PAIN: 0
WHEEZING: 1
DIARRHEA: 0
SINUS PRESSURE: 0
SORE THROAT: 0
ABDOMINAL DISTENTION: 0
PHOTOPHOBIA: 0
FACIAL SWELLING: 0
BACK PAIN: 0

## 2019-09-20 NOTE — PROGRESS NOTES
 Alcohol use: Yes     Comment: every weekend      Current Outpatient Medications   Medication Sig Dispense Refill    azithromycin (ZITHROMAX) 500 MG tablet Take 1 tablet by mouth daily for 5 days 5 tablet 0    predniSONE (DELTASONE) 10 MG tablet Take 20 mg for 5 days, 10 mg for 5 days 15 tablet 0    amLODIPine (NORVASC) 5 MG tablet take 1 tablet by mouth once daily 30 tablet 3    benazepril (LOTENSIN) 20 MG tablet take 1 tablet by mouth once daily 30 tablet 2    topiramate (TOPAMAX) 50 MG tablet take 2 tablets by mouth every morning and 2 tablets by mouth every evening MUST MAKE AND APPOINTMENT 120 tablet 3    Tiotropium Bromide-Olodaterol (STIOLTO RESPIMAT) 2.5-2.5 MCG/ACT AERS Inhale 2.5 Inhalers into the lungs 2 times daily 1 Inhaler 0    predniSONE (DELTASONE) 10 MG tablet Take 20 mg for 3 days, then 10 mg for 3 days 16 tablet 0    phentermine (ADIPEX-P) 37.5 MG tablet Take 37.5 mg by mouth.  tiZANidine (ZANAFLEX) 2 MG capsule Take 2mg nightly for 2 weeks 14 capsule 0    meloxicam (MOBIC) 15 MG tablet Take 1 tablet by mouth daily for 14 days 14 tablet 0    Gabapentin Enacarbil ER (HORIZANT) 300 MG TBCR Take by mouth. .       No current facility-administered medications for this visit. No Known Allergies    Health Maintenance   Topic Date Due    Potassium monitoring  1955    Creatinine monitoring  1955    Hepatitis C screen  1955    Pneumococcal 0-64 years Vaccine (1 of 1 - PPSV23) 04/08/1963    A1C test (Diabetic or Prediabetic)  04/08/1967    HIV screen  04/08/1972    Cervical cancer screen  04/08/1978    Lipid screen  04/08/1997    Shingles Vaccine (1 of 2) 04/08/2007    Colon cancer screen colonoscopy  04/08/2007    Flu vaccine (1) 09/01/2019    Breast cancer screen  03/21/2020    DTaP/Tdap/Td vaccine (2 - Td) 05/31/2029       Subjective:      Review of Systems   Constitutional: Positive for fatigue. Negative for fever and unexpected weight change.    HENT: Psychiatric: Her speech is normal and behavior is normal. Judgment and thought content normal. Cognition and memory are normal. She exhibits a depressed mood. Nursing note and vitals reviewed. /76   Pulse 78   Ht 5' 5.98\" (1.676 m)   Wt 160 lb (72.6 kg)   SpO2 98%   BMI 25.84 kg/m²     Assessment:       Diagnosis Orders   1. Essential hypertension controlled    2. Mycoplasma pnemoniae infection suspected based on clinical picture XR CHEST STANDARD (2 VW)    azithromycin (ZITHROMAX) 500 MG tablet    predniSONE (DELTASONE) 10 MG tablet   3. IFG (impaired fasting glucose) stable    4. Obesity (BMI 30-39.9)     5. Gastroesophageal reflux disease, esophagitis presence not specified     6. Primary insomnia     7. Generalized OA     8. Shoulder impingement syndrome, right     9. Cervicalgia     10. Acute asthmatic bronchitis     11. Chronic maxillary sinusitis                   Plan:    Zithromax 500 mg daily x 5 days  Prednisone tapered  Continue LAMA/LABA inhalers daily  CXR r/o pneumonia   Otherwise the current medical regimen is effective;  continue present plan and medications. Return in about 1 month (around 10/20/2019) for follow up. Orders Placed This Encounter   Procedures    XR CHEST STANDARD (2 VW)     Standing Status:   Future     Standing Expiration Date:   9/20/2020     Order Specific Question:   Reason for exam:     Answer:   mycoplasma        Patient given educational materials - see patient instructions. Discussed use, benefit,and side effects of prescribed medications. All patient questions answered. Pt voiced understanding. Reviewed health maintenance. Instructed to continue current medications, diet and exercise. Patient agreed withtreatment plan. Follow up as directed.      Electronically signed by Darryl Galeas MD on 9/20/2019 at [facility] 3    Have you changed or started any medications since your last visit including any over-the-counter medicines,

## 2019-11-04 DIAGNOSIS — I10 ESSENTIAL HYPERTENSION: ICD-10-CM

## 2019-11-04 RX ORDER — BENAZEPRIL HYDROCHLORIDE 20 MG/1
TABLET ORAL
Qty: 30 TABLET | Refills: 2 | Status: SHIPPED | OUTPATIENT
Start: 2019-11-04 | End: 2020-01-31

## 2019-11-29 DIAGNOSIS — E66.9 OBESITY (BMI 30-39.9): ICD-10-CM

## 2019-11-29 DIAGNOSIS — I10 ESSENTIAL HYPERTENSION: ICD-10-CM

## 2019-12-02 RX ORDER — AMLODIPINE BESYLATE 5 MG/1
TABLET ORAL
Qty: 30 TABLET | Refills: 3 | Status: SHIPPED | OUTPATIENT
Start: 2019-12-02 | End: 2020-03-04

## 2019-12-02 RX ORDER — TOPIRAMATE 50 MG/1
TABLET, FILM COATED ORAL
Qty: 120 TABLET | Refills: 3 | Status: SHIPPED | OUTPATIENT
Start: 2019-12-02 | End: 2020-03-04

## 2019-12-05 ENCOUNTER — TELEPHONE (OUTPATIENT)
Dept: FAMILY MEDICINE CLINIC | Age: 64
End: 2019-12-05

## 2019-12-09 RX ORDER — AZITHROMYCIN 250 MG/1
250 TABLET, FILM COATED ORAL SEE ADMIN INSTRUCTIONS
Qty: 6 TABLET | Refills: 0 | Status: SHIPPED | OUTPATIENT
Start: 2019-12-09 | End: 2019-12-14

## 2020-01-31 RX ORDER — BENAZEPRIL HYDROCHLORIDE 20 MG/1
TABLET ORAL
Qty: 30 TABLET | Refills: 2 | Status: SHIPPED | OUTPATIENT
Start: 2020-01-31 | End: 2020-02-03 | Stop reason: SDUPTHER

## 2020-02-03 RX ORDER — BENAZEPRIL HYDROCHLORIDE 20 MG/1
TABLET ORAL
Qty: 30 TABLET | Refills: 5 | Status: SHIPPED | OUTPATIENT
Start: 2020-02-03

## 2020-03-04 RX ORDER — AMLODIPINE BESYLATE 5 MG/1
TABLET ORAL
Qty: 30 TABLET | Refills: 3 | Status: SHIPPED | OUTPATIENT
Start: 2020-03-04 | End: 2020-03-12 | Stop reason: SDUPTHER

## 2020-03-04 RX ORDER — TOPIRAMATE 50 MG/1
TABLET, FILM COATED ORAL
Qty: 120 TABLET | Refills: 3 | Status: SHIPPED | OUTPATIENT
Start: 2020-03-04 | End: 2020-03-12 | Stop reason: SDUPTHER

## 2020-03-12 RX ORDER — MELOXICAM 15 MG/1
15 TABLET ORAL DAILY
Qty: 14 TABLET | Refills: 0 | Status: SHIPPED | OUTPATIENT
Start: 2020-03-12 | End: 2020-03-26

## 2020-03-12 RX ORDER — AMLODIPINE BESYLATE 5 MG/1
TABLET ORAL
Qty: 30 TABLET | Refills: 3 | Status: SHIPPED | OUTPATIENT
Start: 2020-03-12

## 2020-03-12 RX ORDER — TOPIRAMATE 50 MG/1
TABLET, FILM COATED ORAL
Qty: 120 TABLET | Refills: 3 | Status: SHIPPED | OUTPATIENT
Start: 2020-03-12